# Patient Record
Sex: MALE | Race: ASIAN | NOT HISPANIC OR LATINO | Employment: FULL TIME | URBAN - NONMETROPOLITAN AREA
[De-identification: names, ages, dates, MRNs, and addresses within clinical notes are randomized per-mention and may not be internally consistent; named-entity substitution may affect disease eponyms.]

---

## 2024-05-30 ENCOUNTER — OFFICE VISIT (OUTPATIENT)
Dept: INTERNAL MEDICINE | Facility: CLINIC | Age: 54
End: 2024-05-30
Payer: COMMERCIAL

## 2024-05-30 VITALS
TEMPERATURE: 97.4 F | OXYGEN SATURATION: 92 % | SYSTOLIC BLOOD PRESSURE: 128 MMHG | HEART RATE: 89 BPM | HEIGHT: 67 IN | DIASTOLIC BLOOD PRESSURE: 90 MMHG

## 2024-05-30 DIAGNOSIS — Z00.00 ANNUAL PHYSICAL EXAM: Primary | ICD-10-CM

## 2024-05-30 DIAGNOSIS — Z87.891 FORMER SMOKER: ICD-10-CM

## 2024-05-30 DIAGNOSIS — Z13.228 SCREENING FOR ENDOCRINE, METABOLIC AND IMMUNITY DISORDER: ICD-10-CM

## 2024-05-30 DIAGNOSIS — E78.5 HYPERLIPIDEMIA, UNSPECIFIED HYPERLIPIDEMIA TYPE: ICD-10-CM

## 2024-05-30 DIAGNOSIS — Z11.59 ENCOUNTER FOR HEPATITIS C SCREENING TEST FOR LOW RISK PATIENT: ICD-10-CM

## 2024-05-30 DIAGNOSIS — Z12.5 SCREENING PSA (PROSTATE SPECIFIC ANTIGEN): ICD-10-CM

## 2024-05-30 DIAGNOSIS — Z13.0 SCREENING FOR ENDOCRINE, METABOLIC AND IMMUNITY DISORDER: ICD-10-CM

## 2024-05-30 DIAGNOSIS — Z76.89 ENCOUNTER TO ESTABLISH CARE: ICD-10-CM

## 2024-05-30 DIAGNOSIS — Z12.11 COLON CANCER SCREENING: ICD-10-CM

## 2024-05-30 DIAGNOSIS — I10 PRIMARY HYPERTENSION: ICD-10-CM

## 2024-05-30 DIAGNOSIS — Z13.29 SCREENING FOR ENDOCRINE, METABOLIC AND IMMUNITY DISORDER: ICD-10-CM

## 2024-05-30 PROCEDURE — 99386 PREV VISIT NEW AGE 40-64: CPT | Performed by: NURSE PRACTITIONER

## 2024-05-30 RX ORDER — AMLODIPINE BESYLATE AND ATORVASTATIN CALCIUM 5; 10 MG/1; MG/1
1 TABLET, FILM COATED ORAL DAILY
Qty: 90 TABLET | Refills: 1 | Status: SHIPPED | OUTPATIENT
Start: 2024-05-30

## 2024-05-30 RX ORDER — OLMESARTAN MEDOXOMIL 20 MG/1
20 TABLET ORAL DAILY
COMMUNITY
Start: 2022-04-23 | End: 2024-05-30 | Stop reason: SDUPTHER

## 2024-05-30 RX ORDER — AMLODIPINE BESYLATE AND ATORVASTATIN CALCIUM 5; 10 MG/1; MG/1
1 TABLET, FILM COATED ORAL DAILY
COMMUNITY
Start: 2022-04-23 | End: 2024-05-30 | Stop reason: SDUPTHER

## 2024-05-30 RX ORDER — OLMESARTAN MEDOXOMIL 20 MG/1
20 TABLET ORAL DAILY
Qty: 90 TABLET | Refills: 1 | Status: SHIPPED | OUTPATIENT
Start: 2024-05-30

## 2024-05-30 NOTE — PROGRESS NOTES
Male Physical Note      Date: 2024   Patient Name: Lelia Childs  : 1970   MRN: [id number]     Chief Complaint:    Chief Complaint   Patient presents with    Establish Care     With Loida today.     Hypertension     Was taking BP medication in [place], needs prescription for here    Annual Exam       History of Present Illness: Lelia Childs is a 54 y.o. male who is here today for annual health maintenance, physical, establish care.  He is from [facility] and will be here working at [employer] for 2 years.   is used during appointment, patient speaks Belarusian.  History of Present Illness  Hx of hypertension and hypercholesterolemia, for which he takes olmesartan, amlodipine, atorvastatin daily. He monitors his blood pressure daily, which remains within the normal range.  Denies chest pain, shortness of air, visual changes, headaches, edema.    He has not undergone a colonoscopy or Cologuard.  He expresses interest in doing the stool testing rather than the colonoscopy.  Not had PSA checked.  He has undergone vision exams and dental cleanings within the past year. He maintains a healthy diet and engages in plans to engage in regular physical activity when the gym at his apartment is complete.   He underwent a lung cancer screening in [place] last year, which yielded normal results.  He smoked half a pack a day for 20 years, quit 10 years ago.  He denies experiencing weight loss, chronic cough, wheezing, night sweats.   He drinks about 1 beer a week  Received a tetanus vaccine in [place], he does not have his vaccination records with him.    No concerns or complaints today.    Subjective      Review of Systems:   Review of Systems   All other systems reviewed and are negative.      Past Medical History, Social History, Family History and Care Team were all reviewed with patient and updated as appropriate.     Medications:     Current Outpatient Medications:     amLODIPine-atorvastatin (CADUET) 5-10  "MG per tablet, Take 1 tablet by mouth Daily., Disp: 90 tablet, Rfl: 1    olmesartan (BENICAR) 20 MG tablet, Take 1 tablet by mouth Daily., Disp: 90 tablet, Rfl: 1    Allergies:   No Known Allergies    Immunizations:    There is no immunization history on file for this patient.     Social History     Substance and Sexual Activity   Alcohol Use Not Currently    Alcohol/week: 1.0 standard drink of alcohol    Types: 1 Cans of beer per week      Social History     Substance and Sexual Activity   Drug Use Never      PHQ-2 Depression Screening  PHQ-9 Total Score: 0      Objective     Physical Exam:  Vital Signs:   Vitals:    05/30/24 0922   BP: 128/90   Pulse: 89   Temp: 97.4 °F (36.3 °C)   SpO2: 92%   Height: 170 cm (66.93\")   PainSc: 0-No pain     There is no height or weight on file to calculate BMI.        Physical Exam  Vitals and nursing note reviewed.   Constitutional:       General: He is not in acute distress.     Appearance: Normal appearance. He is normal weight.   HENT:      Head: Normocephalic and atraumatic.      Right Ear: Tympanic membrane, ear canal and external ear normal. There is no impacted cerumen.      Left Ear: Tympanic membrane, ear canal and external ear normal. There is no impacted cerumen.      Nose: Nose normal. No congestion or rhinorrhea.      Mouth/Throat:      Mouth: Mucous membranes are moist.      Pharynx: Oropharynx is clear. No oropharyngeal exudate or posterior oropharyngeal erythema.   Eyes:      General: No scleral icterus.        Right eye: No discharge.         Left eye: No discharge.      Extraocular Movements: Extraocular movements intact.      Conjunctiva/sclera: Conjunctivae normal.      Pupils: Pupils are equal, round, and reactive to light.   Neck:      Thyroid: No thyromegaly.      Vascular: No carotid bruit or JVD.   Cardiovascular:      Rate and Rhythm: Normal rate and regular rhythm.      Pulses:           Dorsalis pedis pulses are 2+ on the right side and 2+ on the left " side.        Posterior tibial pulses are 2+ on the right side and 2+ on the left side.      Heart sounds: Normal heart sounds.   Pulmonary:      Effort: Pulmonary effort is normal.      Breath sounds: Normal breath sounds.   Abdominal:      General: Abdomen is flat. Bowel sounds are normal. There is no distension.      Palpations: Abdomen is soft. There is no mass.      Tenderness: There is no abdominal tenderness. There is no guarding or rebound.      Hernia: No hernia is present.   Musculoskeletal:         General: Normal range of motion.      Cervical back: Normal range of motion and neck supple. No tenderness.      Right lower leg: No edema.      Left lower leg: No edema.   Lymphadenopathy:      Cervical: No cervical adenopathy.   Skin:     General: Skin is warm and dry.      Findings: No rash.   Neurological:      General: No focal deficit present.      Mental Status: He is alert and oriented to person, place, and time.      Cranial Nerves: No cranial nerve deficit.      Sensory: No sensory deficit.      Motor: No weakness.      Coordination: Coordination normal.      Gait: Gait normal.   Psychiatric:         Mood and Affect: Mood normal.         Behavior: Behavior normal.         Thought Content: Thought content normal.         Judgment: Judgment normal.         Assessment / Plan      Assessment/Plan:   Problem List Items Addressed This Visit    None  Visit Diagnoses       Annual physical exam    -  Primary    Relevant Medications    olmesartan (BENICAR) 20 MG tablet    Other Relevant Orders    CBC (No Diff)    Comprehensive Metabolic Panel    Lipid Panel    TSH Rfx On Abnormal To Free T4    Hemoglobin A1c    Hepatitis C Antibody    PSA Screen    Encounter to establish care        Relevant Medications    olmesartan (BENICAR) 20 MG tablet    Other Relevant Orders    CBC (No Diff)    Comprehensive Metabolic Panel    Lipid Panel    TSH Rfx On Abnormal To Free T4    Hemoglobin A1c    Hepatitis C Antibody    PSA  Screen    Primary hypertension        Relevant Medications    olmesartan (BENICAR) 20 MG tablet    amLODIPine-atorvastatin (CADUET) 5-10 MG per tablet    Other Relevant Orders    CBC (No Diff)    Comprehensive Metabolic Panel    Lipid Panel    TSH Rfx On Abnormal To Free T4  Diastolic is slightly elevated at 90 today.  Patient states he checks his blood pressure every day and it is less than 120s over 80s.  Continue monitoring blood pressure, follow DASH diet, exercise 150 minutes/week.  Refill medications, take daily as directed.  Update labs.    Hyperlipidemia, unspecified hyperlipidemia type        Relevant Medications    amLODIPine-atorvastatin (CADUET) 5-10 MG per tablet    Other Relevant Orders    Lipid Panel  Update lipid panel fasting.  Continue medication as prescribed which includes atorvastatin 10 mg daily.  Follow low-cholesterol diet and exercise regimen.    Screening for endocrine, metabolic and immunity disorder        Relevant Orders    Comprehensive Metabolic Panel    TSH Rfx On Abnormal To Free T4    Hemoglobin A1c    Encounter for hepatitis C screening test for low risk patient        Relevant Orders    Hepatitis C Antibody    Screening PSA (prostate specific antigen)        Relevant Orders    PSA Screen    Colon cancer screening        Relevant Orders    Cologuard - Stool, Per Rectum    Former smoker      Patient states received lung cancer screening in [place] which was normal last year            Healthcare Maintenance:  Counseling provided based on age appropriate USPSTF guidelines.  Encouraged 150 minutes of exercise total per week for heart health   Recommend balanced diet, portion control, limiting excess carbs and sweets, limit caffeine   Dental visits twice per year, yearly eye exams      Lelia Childs voices understanding and acceptance of this advice and will call back with any further questions or concerns. AVS with preventive healthcare tips printed for patient.       Follow Up:    Return in about 1 month (around 6/30/2024) for Annual.      JASON Baron  [facility]     Patient or patient representative verbalized consent for the use of Ambient Listening during the visit with  JASON Rodriguez for chart documentation. 5/30/2024  09:47 EDT

## 2024-06-01 LAB
ALBUMIN SERPL-MCNC: 4.8 G/DL (ref 3.5–5.2)
ALBUMIN/GLOB SERPL: 2.4 G/DL
ALP SERPL-CCNC: 71 U/L (ref 39–117)
ALT SERPL-CCNC: 19 U/L (ref 1–41)
AST SERPL-CCNC: 17 U/L (ref 1–40)
BILIRUB SERPL-MCNC: 0.4 MG/DL (ref 0–1.2)
BUN SERPL-MCNC: 14 MG/DL (ref 6–20)
BUN/CREAT SERPL: 14.7 (ref 7–25)
CALCIUM SERPL-MCNC: 9.3 MG/DL (ref 8.6–10.5)
CHLORIDE SERPL-SCNC: 102 MMOL/L (ref 98–107)
CHOLEST SERPL-MCNC: 238 MG/DL (ref 0–200)
CO2 SERPL-SCNC: 27 MMOL/L (ref 22–29)
CREAT SERPL-MCNC: 0.95 MG/DL (ref 0.76–1.27)
EGFRCR SERPLBLD CKD-EPI 2021: 94.5 ML/MIN/1.73
ERYTHROCYTE [DISTWIDTH] IN BLOOD BY AUTOMATED COUNT: 12.1 % (ref 12.3–15.4)
GLOBULIN SER CALC-MCNC: 2 GM/DL
GLUCOSE SERPL-MCNC: 95 MG/DL (ref 65–99)
HBA1C MFR BLD: 5.8 % (ref 4.8–5.6)
HCT VFR BLD AUTO: 46.9 % (ref 37.5–51)
HCV IGG SERPL QL IA: NON REACTIVE
HDLC SERPL-MCNC: 70 MG/DL (ref 40–60)
HGB BLD-MCNC: 15.7 G/DL (ref 13–17.7)
LDLC SERPL CALC-MCNC: 149 MG/DL (ref 0–100)
MCH RBC QN AUTO: 30.7 PG (ref 26.6–33)
MCHC RBC AUTO-ENTMCNC: 33.5 G/DL (ref 31.5–35.7)
MCV RBC AUTO: 91.6 FL (ref 79–97)
PLATELET # BLD AUTO: 352 10*3/MM3 (ref 140–450)
POTASSIUM SERPL-SCNC: 4.2 MMOL/L (ref 3.5–5.2)
PROT SERPL-MCNC: 6.8 G/DL (ref 6–8.5)
PSA SERPL-MCNC: 1.38 NG/ML (ref 0–4)
RBC # BLD AUTO: 5.12 10*6/MM3 (ref 4.14–5.8)
SODIUM SERPL-SCNC: 138 MMOL/L (ref 136–145)
TRIGL SERPL-MCNC: 111 MG/DL (ref 0–150)
TSH SERPL DL<=0.005 MIU/L-ACNC: 1.46 UIU/ML (ref 0.27–4.2)
VLDLC SERPL CALC-MCNC: 19 MG/DL (ref 5–40)
WBC # BLD AUTO: 6.77 10*3/MM3 (ref 3.4–10.8)

## 2024-06-03 NOTE — PROGRESS NOTES
Total and bad cholesterol are elevated.  Good cholesterol is high which is okay.  Work on avoiding trans/saturated fats, fried foods, exercise at least 30 minutes 5 days a week.  Continue taking your cholesterol medication.  A1c is 5.8%, this is considered prediabetes.  Normal is less than 5.6%.  Eliminate sweets and work on diet and exercise  Other labs are normal

## 2024-06-06 ENCOUNTER — PATIENT ROUNDING (BHMG ONLY) (OUTPATIENT)
Dept: INTERNAL MEDICINE | Facility: CLINIC | Age: 54
End: 2024-06-06
Payer: COMMERCIAL

## 2024-06-06 NOTE — PROGRESS NOTES
A [employer] message has been sent to patient for PATIENT ROUNDING with [facility].

## 2024-06-28 ENCOUNTER — OFFICE VISIT (OUTPATIENT)
Dept: INTERNAL MEDICINE | Facility: CLINIC | Age: 54
End: 2024-06-28
Payer: COMMERCIAL

## 2024-06-28 VITALS
HEART RATE: 77 BPM | SYSTOLIC BLOOD PRESSURE: 120 MMHG | HEIGHT: 67 IN | WEIGHT: 148 LBS | TEMPERATURE: 97.7 F | DIASTOLIC BLOOD PRESSURE: 68 MMHG | OXYGEN SATURATION: 97 % | BODY MASS INDEX: 23.23 KG/M2

## 2024-06-28 DIAGNOSIS — E78.2 MIXED HYPERLIPIDEMIA: ICD-10-CM

## 2024-06-28 DIAGNOSIS — I10 PRIMARY HYPERTENSION: Primary | ICD-10-CM

## 2024-06-28 DIAGNOSIS — R73.03 PREDIABETES: ICD-10-CM

## 2024-06-28 PROCEDURE — 99214 OFFICE O/P EST MOD 30 MIN: CPT | Performed by: NURSE PRACTITIONER

## 2024-06-28 RX ORDER — AMLODIPINE BESYLATE 5 MG/1
5 TABLET ORAL DAILY
Qty: 90 TABLET | Refills: 1 | Status: SHIPPED | OUTPATIENT
Start: 2024-06-28

## 2024-06-28 NOTE — PROGRESS NOTES
Office Visit      Date: 2024   Patient Name: Lelia Childs  : 1970   MRN: [id number]     Chief Complaint:    Chief Complaint   Patient presents with    Follow-up    Hypertension       History of Present Illness: Lelia Childs is a 54 y.o. male presents to follow-up for hypertension.  Blood pressure stable.  No chest pain, shortness of air, visual changes, headache, edema.  Patient made a mistake on his medications last time.  He told us he took amlodipine-atorvastatin but he only takes amlodipine 5 mg along with olmesartan 20 mg daily.  He plans to take the other prescription back to the pharmacy to see if he can get a refund.  Cologuard was negative.  A1c was 5.8% prediabetic.  He has began walking after lunch and dinner at least 30 minutes.  He is eating healthier diet.  His cholesterol was elevated as well but he would like to try lifestyle changes and recheck before starting any medication for cholesterol.  Vaccines are reviewed.    Subjective      Review of Systems:   Pertinent ROS noted in HPI.     I have reviewed the patients family history, social history, past medical history, past surgical history and have updated it as appropriate.     Medications:     Current Outpatient Medications:     amLODIPine (NORVASC) 5 MG tablet, Take 1 tablet by mouth Daily., Disp: 90 tablet, Rfl: 1    olmesartan (BENICAR) 20 MG tablet, Take 1 tablet by mouth Daily., Disp: 90 tablet, Rfl: 1    Allergies:   No Known Allergies    Objective     Physical Exam  Physical Exam  Constitutional:       General: He is not in acute distress.     Appearance: Normal appearance. He is normal weight.   HENT:      Head: Normocephalic and atraumatic.   Eyes:      Extraocular Movements: Extraocular movements intact.      Pupils: Pupils are equal, round, and reactive to light.   Cardiovascular:      Rate and Rhythm: Normal rate and regular rhythm.      Heart sounds: Normal heart sounds.   Pulmonary:      Effort: Pulmonary effort  "is normal.      Breath sounds: Normal breath sounds.   Musculoskeletal:         General: Normal range of motion.      Cervical back: Normal range of motion.   Neurological:      General: No focal deficit present.      Mental Status: He is alert and oriented to person, place, and time. Mental status is at baseline.   Psychiatric:         Mood and Affect: Mood normal.         Behavior: Behavior normal.         Thought Content: Thought content normal.         Judgment: Judgment normal.         Vital Signs:   Vitals:    06/28/24 0942   BP: 120/68   Pulse: 77   Temp: 97.7 °F (36.5 °C)   SpO2: 97%   Weight: 67.1 kg (148 lb)   Height: 170 cm (66.93\")   PainSc: 0-No pain     Body mass index is 23.23 kg/m².  BMI is within normal parameters. No other follow-up for BMI required.     Labs:   Results Encounter on 05/30/2024   Component Date Value Ref Range Status    Cologuard 06/10/2024 Negative  Negative Final    Comment:   NEGATIVE TEST RESULT. A negative Cologuard result indicates a low likelihood that a colorectal cancer (CRC) or advanced adenoma (adenomatous polyps with more advanced pre-malignant features)  is present. The chance that a person with a negative Cologuard test has a colorectal cancer is less than 1 in 1500 (negative predictive value >99.9%) or has an  advanced adenoma is less than  5.3% (negative predictive value 94.7%). These data are based on a prospective cross-sectional study of 10,000 individuals at average risk for colorectal cancer who were screened with both Cologuard and colonoscopy. (Zulay HARRINGTON et al, N Engl J Med 2014;370(14):5897-4609) The normal value (reference range) for this assay is negative.    COLOGUARD RE-SCREENING RECOMMENDATION: Periodic colorectal cancer screening is an important part of preventive healthcare for asymptomatic individuals at average risk for colorectal cancer.  Following a negative Cologuard result, the American Cancer Society and U.S.                            " Multi-Society Task Force screening guidelines recommend a Cologuard re-screening interval of 3 years.   References: American Cancer Society Guideline for Colorectal Cancer Screening: https://www.cancer.org/cancer/colon-rectal-cancer/jzznizcws-pcdchqxro-ogsshhk/acs-recommendations.html.; Nuno NAVARRETE, Babs WRIGHT, Emili VALDES, Colorectal Cancer Screening: Recommendations for Physicians and Patients from the U.S. Multi-Society Task Force on Colorectal Cancer Screening , Am J Gastroenterology 2017; 112:7256-6836.    TEST DESCRIPTION: Composite algorithmic analysis of stool DNA-biomarkers with hemoglobin immunoassay.   Quantitative values of individual biomarkers are not reportable and are not associated with individual biomarker result reference ranges. Cologuard is intended for colorectal cancer screening of adults of either sex, 45 years or older, who are at average-risk for colorectal cancer (CRC). Cologuard has been approved for use by the U.S. FDA. The performance of Cologuard was                            established in a cross sectional study of average-risk adults aged 50-84. Cologuard performance in patients ages 45 to 49 years was estimated by sub-group analysis of near-age groups. Colonoscopies performed for a positive result may find as the most clinically significant lesion: colorectal cancer [4.0%], advanced adenoma (including sessile serrated polyps greater than or equal to 1cm diameter) [20%] or non- advanced adenoma [31%]; or no colorectal neoplasia [45%]. These estimates are derived from a prospective cross-sectional screening study of 10,000 individuals at average risk for colorectal cancer who were screened with both Cologuard and colonoscopy. (Zulay Benito al, N Engl J Med 2014;370(14):4261-5289.) Cologuard may produce a false negative or false positive result (no colorectal cancer or precancerous polyp present at colonoscopy follow up). A negative Cologuard test result does not guarantee the absence  of CRC or advanced adenoma (pre-cancer). The current Cologuard                            screening interval is every 3 years. (American Cancer Society and U.S. Multi-Society Task Force). Cologuard performance data in a 10,000 patient pivotal study using colonoscopy as the reference method can be accessed at the following location: www.[employer]/results. Additional description of the Cologuard test process, warnings and precautions can be found at www.[employer].     Office Visit on 05/30/2024   Component Date Value Ref Range Status    WBC 05/31/2024 6.77  3.40 - 10.80 10*3/mm3 Final    RBC 05/31/2024 5.12  4.14 - 5.80 10*6/mm3 Final    Hemoglobin 05/31/2024 15.7  13.0 - 17.7 g/dL Final    Hematocrit 05/31/2024 46.9  37.5 - 51.0 % Final    MCV 05/31/2024 91.6  79.0 - 97.0 fL Final    MCH 05/31/2024 30.7  26.6 - 33.0 pg Final    MCHC 05/31/2024 33.5  31.5 - 35.7 g/dL Final    RDW 05/31/2024 12.1 (L)  12.3 - 15.4 % Final    Platelets 05/31/2024 352  140 - 450 10*3/mm3 Final    Glucose 05/31/2024 95  65 - 99 mg/dL Final    BUN 05/31/2024 14  6 - 20 mg/dL Final    Creatinine 05/31/2024 0.95  0.76 - 1.27 mg/dL Final    EGFR Result 05/31/2024 94.5  >60.0 mL/min/1.73 Final    Comment: GFR Normal >60  Chronic Kidney Disease <60  Kidney Failure <15      BUN/Creatinine Ratio 05/31/2024 14.7  7.0 - 25.0 Final    Sodium 05/31/2024 138  136 - 145 mmol/L Final    Potassium 05/31/2024 4.2  3.5 - 5.2 mmol/L Final    Chloride 05/31/2024 102  98 - 107 mmol/L Final    Total CO2 05/31/2024 27.0  22.0 - 29.0 mmol/L Final    Calcium 05/31/2024 9.3  8.6 - 10.5 mg/dL Final    Total Protein 05/31/2024 6.8  6.0 - 8.5 g/dL Final    Albumin 05/31/2024 4.8  3.5 - 5.2 g/dL Final    Globulin 05/31/2024 2.0  gm/dL Final    A/G Ratio 05/31/2024 2.4  g/dL Final    Total Bilirubin 05/31/2024 0.4  0.0 - 1.2 mg/dL Final    Alkaline Phosphatase 05/31/2024 71  39 - 117 U/L Final    AST (SGOT) 05/31/2024 17  1 - 40 U/L Final    ALT (SGPT)  05/31/2024 19  1 - 41 U/L Final    Total Cholesterol 05/31/2024 238 (H)  0 - 200 mg/dL Final    Comment: Cholesterol Reference Ranges  (U.S. Department of Health and Human Services ATP III  Classifications)  Desirable          <200 mg/dL  Borderline High    200-239 mg/dL  High Risk          >240 mg/dL  Triglyceride Reference Ranges  (U.S. Department of Health and Human Services ATP III  Classifications)  Normal           <150 mg/dL  Borderline High  150-199 mg/dL  High             200-499 mg/dL  Very High        >500 mg/dL  HDL Reference Ranges  (U.S. Department of Health and Human Services ATP III  Classifications)  Low     <40 mg/dl (major risk factor for CHD)  High    >60 mg/dl ('negative' risk factor for CHD)  LDL Reference Ranges  (U.S. Department of Health and Human Services ATP III  Classifications)  Optimal          <100 mg/dL  Near Optimal     100-129 mg/dL  Borderline High  130-159 mg/dL  High             160-189 mg/dL  Very High        >189 mg/dL      Triglycerides 05/31/2024 111  0 - 150 mg/dL Final    HDL Cholesterol 05/31/2024 70 (H)  40 - 60 mg/dL Final    VLDL Cholesterol Dagoberto 05/31/2024 19  5 - 40 mg/dL Final    LDL Chol Calc (NIH) 05/31/2024 149 (H)  0 - 100 mg/dL Final    TSH 05/31/2024 1.460  0.270 - 4.200 uIU/mL Final    Hemoglobin A1C 05/31/2024 5.80 (H)  4.80 - 5.60 % Final    Comment: Hemoglobin A1C Ranges:  Increased Risk for Diabetes  5.7% to 6.4%  Diabetes                     >= 6.5%  Diabetic Goal                < 7.0%      Hep C Virus Ab 05/31/2024 Non Reactive  Non Reactive Final    Comment: HCV antibody alone does not differentiate between previously  resolved infection and active infection. Equivocal and Reactive  HCV antibody results should be followed up with an HCV RNA test  to support the diagnosis of active HCV infection.      PSA 05/31/2024 1.380  0.000 - 4.000 ng/mL Final    Comment: Testing Method: Roche [employer]ostics Electrochemiluminescence  Immunoassay(ECLIA)  Values obtained  with different assay methods or kits cannot  be used interchangeably.          Assessment / Plan      Assessment/Plan:   Problem List Items Addressed This Visit          Cardiac and Vasculature    Mixed hyperlipidemia    Primary hypertension - Primary    Relevant Medications    amLODIPine (NORVASC) 5 MG tablet       Endocrine and Metabolic    Prediabetes       Hypertension  Fixed prescriptions.  Refilled amlodipine 5 mg daily.  Continue olmesartan 20 mg daily.  DASH diet, exercise 150 minutes a week, home blood pressure monitoring.    Hyperlipidemia  The 10-year ASCVD risk score (Carolin NAVARRETE, et al., 2019) is: 5.3%   Continue to work on lifestyle changes with low-cholesterol diet and exercise.  Can recheck cholesterol in 6 months.     Prediabetes  Continue to work on lifestyle changes, exercise and diet.  Can recheck in 6 months for monitoring.  Discussed risk of diabetes.    Follow Up:   Return in about 6 months (around 12/28/2024) for HTN, HLD, Prediabetes .      Loida GOMEZ  [facility] [place]

## 2024-12-23 DIAGNOSIS — Z76.89 ENCOUNTER TO ESTABLISH CARE: ICD-10-CM

## 2024-12-23 DIAGNOSIS — Z00.00 ANNUAL PHYSICAL EXAM: ICD-10-CM

## 2024-12-23 DIAGNOSIS — I10 PRIMARY HYPERTENSION: ICD-10-CM

## 2024-12-23 RX ORDER — OLMESARTAN MEDOXOMIL 20 MG/1
20 TABLET ORAL DAILY
Qty: 90 TABLET | Refills: 0 | Status: SHIPPED | OUTPATIENT
Start: 2024-12-23

## 2024-12-23 RX ORDER — AMLODIPINE BESYLATE 5 MG/1
5 TABLET ORAL DAILY
Qty: 90 TABLET | Refills: 0 | Status: SHIPPED | OUTPATIENT
Start: 2024-12-23

## 2024-12-27 ENCOUNTER — OFFICE VISIT (OUTPATIENT)
Dept: INTERNAL MEDICINE | Facility: CLINIC | Age: 54
End: 2024-12-27
Payer: COMMERCIAL

## 2024-12-27 VITALS
HEART RATE: 71 BPM | WEIGHT: 154 LBS | TEMPERATURE: 98.1 F | DIASTOLIC BLOOD PRESSURE: 80 MMHG | BODY MASS INDEX: 24.17 KG/M2 | HEIGHT: 67 IN | OXYGEN SATURATION: 96 % | SYSTOLIC BLOOD PRESSURE: 122 MMHG

## 2024-12-27 DIAGNOSIS — E78.2 MIXED HYPERLIPIDEMIA: ICD-10-CM

## 2024-12-27 DIAGNOSIS — I10 PRIMARY HYPERTENSION: Primary | ICD-10-CM

## 2024-12-27 DIAGNOSIS — R73.03 PREDIABETES: ICD-10-CM

## 2024-12-27 PROCEDURE — 99214 OFFICE O/P EST MOD 30 MIN: CPT | Performed by: NURSE PRACTITIONER

## 2024-12-27 NOTE — PROGRESS NOTES
Office Visit      Date: 2024   Patient Name: Lelia Childs  : 1970   MRN: [id number]     Chief Complaint:    Chief Complaint   Patient presents with    Follow-up    Hypertension    Hyperlipidemia    Prediabetes       History of Present Illness: Lelia Childs is a 54 y.o. male presenting to follow-up hypertension, hyperlipidemia, prediabetes.  Using  during visit.  Cholesterol was borderline this visit.  ASCVD score 5.5% and told to work on low-cholesterol diet and exercise.  Was prediabetic at 5.8% and was told to eliminate sugars and refined carbs in diet. He has been working on diet and he is going to the gym 2 days per week at his apartment and trying to walk 30 minutes on his lunch break.  Patient has gained 6 pounds since last visit.    Answers submitted by the patient for this visit:  Primary Reason for Visit (Submitted on 2024)  What is the primary reason for your visit?: High Blood Pressure  High Blood Pressure Questionnaire (Submitted on 2024)  Chief Complaint: Hypertension  Chronicity: chronic  Onset: more than 1 year ago  Progression since onset: improved  anxiety: No  blurred vision: No  chest pain: No  headaches: No  malaise/fatigue: No  orthopnea: No  palpitations: No  peripheral edema: No  shortness of breath: No  Agents associated with hypertension: no associated agents  Compliance problems: no compliance problems    The 10-year ASCVD risk score (Carolin NAVARRETE, et al., 2019) is: 5.5%    Values used to calculate the score:      Age: 54 years      Sex: Male      Is Non- : No      Diabetic: No      Tobacco smoker: No      Systolic Blood Pressure: 122 mmHg      Is BP treated: Yes      HDL Cholesterol: 70 mg/dL      Total Cholesterol: 238 mg/dL     Lab Results   Component Value Date    HGBA1C 5.80 (H) 2024       Subjective      Review of Systems:   Pertinent ROS noted in HPI.     I have reviewed the patients family history,  "social history, past medical history, past surgical history and have updated it as appropriate.     Medications:     Current Outpatient Medications:     amLODIPine (NORVASC) 5 MG tablet, Take 1 tablet by mouth once daily, Disp: 90 tablet, Rfl: 0    olmesartan (BENICAR) 20 MG tablet, Take 1 tablet by mouth once daily, Disp: 90 tablet, Rfl: 0    Allergies:   No Known Allergies    Objective     Physical Exam  Constitutional:       General: He is not in acute distress.     Appearance: Normal appearance.   HENT:      Head: Normocephalic and atraumatic.   Eyes:      General: No scleral icterus.     Extraocular Movements: Extraocular movements intact.      Pupils: Pupils are equal, round, and reactive to light.   Cardiovascular:      Rate and Rhythm: Normal rate and regular rhythm.      Heart sounds: Normal heart sounds.   Pulmonary:      Effort: Pulmonary effort is normal.      Breath sounds: Normal breath sounds.   Musculoskeletal:         General: Normal range of motion.      Cervical back: Normal range of motion.   Skin:     General: Skin is warm and dry.   Neurological:      General: No focal deficit present.      Mental Status: He is alert and oriented to person, place, and time. Mental status is at baseline.   Psychiatric:         Mood and Affect: Mood normal.         Behavior: Behavior normal.         Vital Signs:   Vitals:    12/27/24 1024   BP: 122/80   Pulse: 71   Temp: 98.1 °F (36.7 °C)   SpO2: 96%   Weight: 69.9 kg (154 lb)   Height: 170 cm (66.93\")     Body mass index is 24.17 kg/m².  BMI is within normal parameters. No other follow-up for BMI required.     Assessment / Plan      Assessment/Plan:   Diagnoses and all orders for this visit:    1. Primary hypertension (Primary)    2. Mixed hyperlipidemia  -     Lipid Panel    3. Prediabetes  -     Hemoglobin A1c    Was told to work on lifestyle changes and we could reassess cholesterol and blood sugar in 6 months  He has implemented more physical activity and " eat a more healthy balanced diet for the past 6 months  Will recheck lipid panel and A1c as he is fasting today  Continue diet and exercise changes  Blood pressure is stable, continue antihypertensive medications  Follow-up in 6 months for physical, sooner if needed    Follow Up:   Return in about 6 months (around 6/27/2025) for Annual Physical.      Loida GOMEZ  [facility] [place]

## 2024-12-28 LAB
CHOLEST SERPL-MCNC: 217 MG/DL (ref 0–200)
HBA1C MFR BLD: 5.6 % (ref 4.8–5.6)
HDLC SERPL-MCNC: 60 MG/DL (ref 40–60)
LDLC SERPL CALC-MCNC: 143 MG/DL (ref 0–100)
TRIGL SERPL-MCNC: 80 MG/DL (ref 0–150)
VLDLC SERPL CALC-MCNC: 14 MG/DL (ref 5–40)

## 2024-12-31 NOTE — PROGRESS NOTES
Total cholesterol is lower, bad cholesterol remains stable in 140s. Can hold off on cholesterol medication for now continue working on diet/exercise   Blood sugar is better

## 2025-03-19 DIAGNOSIS — Z00.00 ANNUAL PHYSICAL EXAM: ICD-10-CM

## 2025-03-19 DIAGNOSIS — I10 PRIMARY HYPERTENSION: ICD-10-CM

## 2025-03-19 DIAGNOSIS — Z76.89 ENCOUNTER TO ESTABLISH CARE: ICD-10-CM

## 2025-03-19 RX ORDER — OLMESARTAN MEDOXOMIL 20 MG/1
20 TABLET ORAL DAILY
Qty: 90 TABLET | Refills: 0 | Status: SHIPPED | OUTPATIENT
Start: 2025-03-19

## 2025-03-19 RX ORDER — AMLODIPINE BESYLATE 5 MG/1
5 TABLET ORAL DAILY
Qty: 90 TABLET | Refills: 0 | Status: SHIPPED | OUTPATIENT
Start: 2025-03-19

## 2025-06-16 DIAGNOSIS — Z00.00 ANNUAL PHYSICAL EXAM: ICD-10-CM

## 2025-06-16 DIAGNOSIS — Z76.89 ENCOUNTER TO ESTABLISH CARE: ICD-10-CM

## 2025-06-16 DIAGNOSIS — I10 PRIMARY HYPERTENSION: ICD-10-CM

## 2025-06-16 RX ORDER — OLMESARTAN MEDOXOMIL 20 MG/1
20 TABLET ORAL DAILY
Qty: 90 TABLET | Refills: 0 | Status: SHIPPED | OUTPATIENT
Start: 2025-06-16

## 2025-06-16 RX ORDER — AMLODIPINE BESYLATE 5 MG/1
5 TABLET ORAL DAILY
Qty: 90 TABLET | Refills: 0 | Status: SHIPPED | OUTPATIENT
Start: 2025-06-16

## 2025-06-30 ENCOUNTER — OFFICE VISIT (OUTPATIENT)
Dept: INTERNAL MEDICINE | Facility: CLINIC | Age: 55
End: 2025-06-30
Payer: COMMERCIAL

## 2025-06-30 VITALS
SYSTOLIC BLOOD PRESSURE: 120 MMHG | WEIGHT: 146 LBS | HEART RATE: 79 BPM | TEMPERATURE: 97.9 F | DIASTOLIC BLOOD PRESSURE: 78 MMHG | HEIGHT: 67 IN | OXYGEN SATURATION: 98 % | BODY MASS INDEX: 22.91 KG/M2

## 2025-06-30 DIAGNOSIS — R73.03 PREDIABETES: ICD-10-CM

## 2025-06-30 DIAGNOSIS — I10 PRIMARY HYPERTENSION: ICD-10-CM

## 2025-06-30 DIAGNOSIS — Z00.00 ANNUAL PHYSICAL EXAM: Primary | ICD-10-CM

## 2025-06-30 DIAGNOSIS — E78.2 MIXED HYPERLIPIDEMIA: ICD-10-CM

## 2025-06-30 PROCEDURE — 99396 PREV VISIT EST AGE 40-64: CPT | Performed by: NURSE PRACTITIONER

## 2025-06-30 NOTE — PROGRESS NOTES
Male Physical Note      Date: 2025   Patient Name: Lelia Childs  : 1970   MRN: [id number]     Chief Complaint:    Chief Complaint   Patient presents with    Annual Exam       History of Present Illness: Lelia Childs is a 55 y.o. male who is here today for annual health maintenance and physical.  HTN stable, compliant with meds. No chest pain, soa, visual changes, headaches, edema  HLD on statin therapy  Prediabetes- last A1c stable 5.6%  He has been working on diet/exercise and has lost weight 8 lbs     Lab Results   Component Value Date    HGBA1C 5.60 2024    HGBA1C 5.80 (H) 2024       Subjective      Review of Systems:   Review of Systems   All other systems reviewed and are negative.      Past Medical History, Social History, Family History and Care Team were all reviewed with patient and updated as appropriate.     Medications:     Current Outpatient Medications:     amLODIPine (NORVASC) 5 MG tablet, Take 1 tablet by mouth once daily, Disp: 90 tablet, Rfl: 0    olmesartan (BENICAR) 20 MG tablet, Take 1 tablet by mouth once daily, Disp: 90 tablet, Rfl: 0    Allergies:   No Known Allergies    Immunizations:    There is no immunization history on file for this patient.       Tobacco Use: Medium Risk (2024)    Patient History     Smoking Tobacco Use: Former     Smokeless Tobacco Use: Unknown     Passive Exposure: Not on file       Social History     Substance and Sexual Activity   Alcohol Use Not Currently    Alcohol/week: 1.0 standard drink of alcohol    Types: 1 Cans of beer per week        Social History     Substance and Sexual Activity   Drug Use Never        PHQ-2 Depression Screening  Little interest or pleasure in doing things? Not at all   Feeling down, depressed, or hopeless? Not at all   PHQ-2 Total Score 0       The 10-year ASCVD risk score (Carolin NAVARRETE, et al., 2019) is: 6%    Values used to calculate the score:      Age: 55 years      Sex: Male      Is  "Non- : No      Diabetic: No      Tobacco smoker: No      Systolic Blood Pressure: 120 mmHg      Is BP treated: Yes      HDL Cholesterol: 60 mg/dL      Total Cholesterol: 217 mg/dL    Labs:  Results for orders placed or performed in visit on 12/27/24   Lipid Panel    Collection Time: 12/27/24 10:59 AM    Specimen: Blood   Result Value Ref Range    Total Cholesterol 217 (H) 0 - 200 mg/dL    Triglycerides 80 0 - 150 mg/dL    HDL Cholesterol 60 40 - 60 mg/dL    VLDL Cholesterol Dagoberto 14 5 - 40 mg/dL    LDL Chol Calc (NIH) 143 (H) 0 - 100 mg/dL   Hemoglobin A1c    Collection Time: 12/27/24 10:59 AM    Specimen: Blood   Result Value Ref Range    Hemoglobin A1C 5.60 4.80 - 5.60 %       Objective     Vital Signs:   Vitals:    06/30/25 0813   BP: 120/78   Pulse: 79   Temp: 97.9 °F (36.6 °C)   SpO2: 98%   Weight: 66.2 kg (146 lb)   Height: 170 cm (66.93\")   PainSc: 0-No pain     Body mass index is 22.91 kg/m².   BMI is within normal parameters. No other follow-up for BMI required.       Physical Exam:  General: well-developed, well-nourished, in no acute distress, stated age  HEENT: normocephalic, atraumatic. PERRLA. EOM intact. TM clear bilaterally. Pink mucosa. Oropharynx clear  Neck: supple, no lymphadenopathy, thyromegaly, or masses. Normal ROM  CV: RRR. No murmurs, rubs, gallops. No peripheral edema or varicosities   Respiratory: Clear bilaterally. No wheezes, rales or rhonchi.   Abdomen: soft, nontender. Nohepatosplenomegaly. Bowl sounds normal.  Musculoskeletal: ROM normal. No obvious joint swelling or deformity.   Neuro: A&O x 2. Cranial nerves II-XII intact. No focal deficits.  Skin: warm, dry, without obvious rashes or lesions.  Psych: Normal mood and affect. Though process coherent.       Assessment / Plan      Assessment/Plan:   Diagnoses and all orders for this visit:    1. Annual physical exam (Primary)  -     CBC (No Diff)  -     Comprehensive Metabolic Panel  -     Lipid Panel  -     TSH " Rfx On Abnormal To Free T4  -     Hemoglobin A1c    2. Primary hypertension  -     CBC (No Diff)  -     Comprehensive Metabolic Panel  -     Lipid Panel  -     TSH Rfx On Abnormal To Free T4    3. Mixed hyperlipidemia  -     Lipid Panel    4. Prediabetes  -     Comprehensive Metabolic Panel  -     Hemoglobin A1c       Plan:  HTN stable.  Continue medication as prescribed, DASH diet, 150 minutes of cardiovascular exercise weekly, home blood pressure monitoring.  HLD stable continue statin  Update A1c/fasting glucose  Continue to work on diet/exercise    Healthcare Maintenance:  Counseling provided based on age appropriate USPSTF guidelines.  Encouraged 150 minutes of exercise total per week for heart health   Recommend balanced healthy diet   Dental visits twice per year, yearly eye exams, yearly skin assessments with dermatology   Colonoscopy every 10 years or Cologuard every 3 years, UTD  PSA every 2 years, UTD  AAA screening 65+ if history of or current smoker   Lung cancer screening 5N/A  BMI is within normal parameters. No other follow-up for BMI required.      Lelia Childs voices understanding and acceptance of this advice and will call back with any further questions or concerns. AVS with preventive healthcare tips printed for patient.       Follow Up:   Return in about 6 months (around 12/30/2025) for HTN.      JASON Baron  [facility]

## 2025-07-01 LAB
ALBUMIN SERPL-MCNC: 4.9 G/DL (ref 3.5–5.2)
ALBUMIN/GLOB SERPL: 1.8 G/DL
ALP SERPL-CCNC: 87 U/L (ref 39–117)
ALT SERPL-CCNC: 35 U/L (ref 1–41)
AST SERPL-CCNC: 23 U/L (ref 1–40)
BILIRUB SERPL-MCNC: 0.5 MG/DL (ref 0–1.2)
BUN SERPL-MCNC: 18 MG/DL (ref 6–20)
BUN/CREAT SERPL: 19.8 (ref 7–25)
CALCIUM SERPL-MCNC: 9.3 MG/DL (ref 8.6–10.5)
CHLORIDE SERPL-SCNC: 103 MMOL/L (ref 98–107)
CHOLEST SERPL-MCNC: 243 MG/DL (ref 0–200)
CO2 SERPL-SCNC: 27.3 MMOL/L (ref 22–29)
CREAT SERPL-MCNC: 0.91 MG/DL (ref 0.76–1.27)
EGFRCR SERPLBLD CKD-EPI 2021: 98.9 ML/MIN/1.73
ERYTHROCYTE [DISTWIDTH] IN BLOOD BY AUTOMATED COUNT: 12.8 % (ref 12.3–15.4)
GLOBULIN SER CALC-MCNC: 2.7 GM/DL
GLUCOSE SERPL-MCNC: 89 MG/DL (ref 65–99)
HBA1C MFR BLD: 5.7 % (ref 4.8–5.6)
HCT VFR BLD AUTO: 47.5 % (ref 37.5–51)
HDLC SERPL-MCNC: 60 MG/DL (ref 40–60)
HGB BLD-MCNC: 15.7 G/DL (ref 13–17.7)
LDLC SERPL CALC-MCNC: 162 MG/DL (ref 0–100)
MCH RBC QN AUTO: 31.2 PG (ref 26.6–33)
MCHC RBC AUTO-ENTMCNC: 33.1 G/DL (ref 31.5–35.7)
MCV RBC AUTO: 94.2 FL (ref 79–97)
PLATELET # BLD AUTO: 331 10*3/MM3 (ref 140–450)
POTASSIUM SERPL-SCNC: 4.4 MMOL/L (ref 3.5–5.2)
PROT SERPL-MCNC: 7.6 G/DL (ref 6–8.5)
RBC # BLD AUTO: 5.04 10*6/MM3 (ref 4.14–5.8)
SODIUM SERPL-SCNC: 141 MMOL/L (ref 136–145)
TRIGL SERPL-MCNC: 121 MG/DL (ref 0–150)
TSH SERPL DL<=0.005 MIU/L-ACNC: 2.14 UIU/ML (ref 0.27–4.2)
VLDLC SERPL CALC-MCNC: 21 MG/DL (ref 5–40)
WBC # BLD AUTO: 7.7 10*3/MM3 (ref 3.4–10.8)